# Patient Record
Sex: FEMALE | Race: ASIAN | NOT HISPANIC OR LATINO
[De-identification: names, ages, dates, MRNs, and addresses within clinical notes are randomized per-mention and may not be internally consistent; named-entity substitution may affect disease eponyms.]

---
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
## 2019-02-01 ENCOUNTER — OUTPATIENT (OUTPATIENT)
Dept: OUTPATIENT SERVICES | Facility: HOSPITAL | Age: 19
LOS: 1 days | End: 2019-02-01

## 2019-02-01 ENCOUNTER — OUTPATIENT (OUTPATIENT)
Dept: OUTPATIENT SERVICES | Facility: HOSPITAL | Age: 19
LOS: 1 days | End: 2019-02-01
Payer: MEDICAID

## 2019-02-01 PROCEDURE — G9001: CPT

## 2019-02-28 ENCOUNTER — APPOINTMENT (OUTPATIENT)
Dept: PEDIATRIC ADOLESCENT MEDICINE | Facility: HOSPITAL | Age: 19
End: 2019-02-28
Payer: MEDICAID

## 2019-02-28 VITALS — HEART RATE: 85 BPM | DIASTOLIC BLOOD PRESSURE: 68 MMHG | SYSTOLIC BLOOD PRESSURE: 116 MMHG | WEIGHT: 102.5 LBS

## 2019-02-28 DIAGNOSIS — Z11.1 ENCOUNTER FOR SCREENING FOR RESPIRATORY TUBERCULOSIS: ICD-10-CM

## 2019-02-28 DIAGNOSIS — Z11.4 ENCOUNTER FOR SCREENING FOR HUMAN IMMUNODEFICIENCY VIRUS [HIV]: ICD-10-CM

## 2019-02-28 PROCEDURE — 99214 OFFICE O/P EST MOD 30 MIN: CPT

## 2019-03-01 DIAGNOSIS — Z71.89 OTHER SPECIFIED COUNSELING: ICD-10-CM

## 2019-03-01 NOTE — RISK ASSESSMENT
[Eats regular meals including adequate fruits and vegetables] : eats regular meals including adequate fruits and vegetables [Has friends] : has friends [Eats meals with family] : eats meals with family [Has family members/adults to turn to for help] : has family members/adults to turn to for help [Uses tobacco] : does not use tobacco [Uses drugs] : does not use drugs  [de-identified] : marijuana daily

## 2019-03-01 NOTE — DISCUSSION/SUMMARY
[FreeTextEntry1] : 19yo F here for f/u\par \par OCP\par - STI testing\par - Return in 2 weeks to discuss OCP\par \par Routine\par - Flu and Tdap\par \par Weight\par - Nutrition follow up\par - Return in 2 weeks for weight check\par

## 2019-03-01 NOTE — HISTORY OF PRESENT ILLNESS
[FreeTextEntry6] : 18 year old with a history of depression and eating disorder (many years post-treatment) presenting for work forms. 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 \par \par Was on OCP over the summer, but has not gotten refill since June. Had irregular periods during that time. Menses occur every 2-3 months since stopping OCP. LMP November 2019. Not currently sexually active. Last sexual activity in July. Interested in STD testing today.\par \par Thinks she lost weight since the summer. She is a vegetarian. Weight loss was not intentional. She was very stressed recently. Has been trying to gain weight. Feels like she misses meals because she has been busier.\par Breakfast: bagel, shake, fruit with oatmeal. Usually breakfast is 3 times per week\par Lunch: pizza one slice\par Dinner: pizza - 5 grandma slices\par Snack: protein bar or fruit\par \par \par

## 2019-03-01 NOTE — END OF VISIT
[] : Resident [FreeTextEntry3] : 18 year old female for f/u as needs form completed for EMT school. Tdap and Flu Vaccine administered without issue. VIS provided. Consent obtained. Counseled on common/severe reactions, reasons to see MD. Quantiferon gold sent. Will call when form complete. Noted to have irregular periods again off of OCP. Interested in going back on OCP. Also with unintended weight loss in setting of eating less over past 6 months. Think she may have reached 123 pounds last summer. Actively trying to gain weight over past few weeks once amount of weight loss noted. No restriction/desire to lose weight. Says mood is good. Denies any depressed mood or SI. WIll send labs below to check hormones and due to weight loss. Food log given, nutrition recs today. RTC 2 weeks. Will discuss OCP at that time vs working on weight gain to see if menses resume. \par

## 2019-03-05 LAB
C TRACH RRNA SPEC QL NAA+PROBE: NOT DETECTED
ESTRADIOL SERPL-MCNC: 54 PG/ML
FSH SERPL-MCNC: 6.6 IU/L
HIV1+2 AB SPEC QL IA.RAPID: NONREACTIVE
LH SERPL-ACNC: 9.9 IU/L
N GONORRHOEA RRNA SPEC QL NAA+PROBE: NOT DETECTED
SOURCE AMPLIFICATION: NORMAL
T4 SERPL-MCNC: 6.8 UG/DL
TSH SERPL-ACNC: 1.71 UIU/ML

## 2019-03-12 ENCOUNTER — FORM ENCOUNTER (OUTPATIENT)
Age: 19
End: 2019-03-12

## 2019-03-12 ENCOUNTER — APPOINTMENT (OUTPATIENT)
Dept: PEDIATRIC ADOLESCENT MEDICINE | Facility: HOSPITAL | Age: 19
End: 2019-03-12
Payer: MEDICAID

## 2019-03-12 VITALS — DIASTOLIC BLOOD PRESSURE: 69 MMHG | WEIGHT: 106 LBS | HEART RATE: 80 BPM | SYSTOLIC BLOOD PRESSURE: 110 MMHG

## 2019-03-12 DIAGNOSIS — R76.12 NONSPECIFIC REACTION TO CELL MEDIATED IMMUNITY MEASUREMENT OF GAMMA INTERFERON ANTIGEN RESPONSE W/OUT ACTIVE TUBERCULOSIS: ICD-10-CM

## 2019-03-12 DIAGNOSIS — R63.4 ABNORMAL WEIGHT LOSS: ICD-10-CM

## 2019-03-12 DIAGNOSIS — N92.6 IRREGULAR MENSTRUATION, UNSPECIFIED: ICD-10-CM

## 2019-03-12 PROCEDURE — 99214 OFFICE O/P EST MOD 30 MIN: CPT

## 2019-03-12 NOTE — PHYSICAL EXAM
[No Acute Distress] : no acute distress [Normocephalic] : normocephalic [EOMI] : EOMI [Clear TM bilaterally] : clear tympanic membranes bilaterally [Pink Nasal Mucosa] : pink nasal mucosa [Nonerythematous Oropharynx] : nonerythematous oropharynx [Nontender Cervical Lymph Nodes] : nontender cervical lymph nodes [Supple] : supple [FROM] : full passive range of motion [Clear to Ausculatation Bilaterally] : clear to auscultation bilaterally [Regular Rate and Rhythm] : regular rate and rhythm [Normal S1, S2 audible] : normal S1, S2 audible [No Murmurs] : no murmurs [Soft] : soft [NonTender] : non tender [Non Distended] : non distended [Normal Bowel Sounds] : normal bowel sounds [No Hepatosplenomegaly] : no hepatosplenomegaly [No Abnormal Lymph Nodes Palpated] : no abnormal lymph nodes palpated [Moves All Extremities x 4] : moves all extremities x4 [Warm, Well Perfused x4] : warm, well perfused x4 [Capillary Refill <2s] : capillary refill < 2s [Straight] : straight [Normotonic] : normotonic [Warm] : warm [Dry] : dry

## 2019-03-13 ENCOUNTER — OUTPATIENT (OUTPATIENT)
Dept: OUTPATIENT SERVICES | Facility: HOSPITAL | Age: 19
LOS: 1 days | End: 2019-03-13
Payer: MEDICAID

## 2019-03-13 ENCOUNTER — APPOINTMENT (OUTPATIENT)
Dept: RADIOLOGY | Facility: IMAGING CENTER | Age: 19
End: 2019-03-13
Payer: MEDICAID

## 2019-03-13 ENCOUNTER — OUTPATIENT (OUTPATIENT)
Dept: OUTPATIENT SERVICES | Facility: HOSPITAL | Age: 19
LOS: 1 days | End: 2019-03-13

## 2019-03-13 ENCOUNTER — APPOINTMENT (OUTPATIENT)
Dept: RADIOLOGY | Facility: CLINIC | Age: 19
End: 2019-03-13

## 2019-03-13 DIAGNOSIS — R76.12 NONSPECIFIC REACTION TO CELL MEDIATED IMMUNITY MEASUREMENT OF GAMMA INTERFERON ANTIGEN RESPONSE WITHOUT ACTIVE TUBERCULOSIS: ICD-10-CM

## 2019-03-13 LAB
BASOPHILS # BLD AUTO: 0.07 K/UL
BASOPHILS NFR BLD AUTO: 0.9 %
EOSINOPHIL # BLD AUTO: 0.1 K/UL
EOSINOPHIL NFR BLD AUTO: 1.3 %
HCT VFR BLD CALC: 38.2 %
HGB BLD-MCNC: 11.3 G/DL
IMM GRANULOCYTES NFR BLD AUTO: 0.1 %
LYMPHOCYTES # BLD AUTO: 3.43 K/UL
LYMPHOCYTES NFR BLD AUTO: 45.1 %
M TB IFN-G BLD-IMP: POSITIVE
MAN DIFF?: NORMAL
MCHC RBC-ENTMCNC: 22.6 PG
MCHC RBC-ENTMCNC: 29.6 GM/DL
MCV RBC AUTO: 76.6 FL
MONOCYTES # BLD AUTO: 0.58 K/UL
MONOCYTES NFR BLD AUTO: 7.6 %
NEUTROPHILS # BLD AUTO: 3.41 K/UL
NEUTROPHILS NFR BLD AUTO: 45 %
PLATELET # BLD AUTO: 298 K/UL
QUANTIFERON TB PLUS MITOGEN MINUS NIL: 6.66 IU/ML
QUANTIFERON TB PLUS NIL: 0.11 IU/ML
QUANTIFERON TB PLUS TB1 MINUS NIL: 5.82 IU/ML
QUANTIFERON TB PLUS TB2 MINUS NIL: 6.66 IU/ML
RBC # BLD: 4.99 M/UL
RBC # FLD: 17.6 %
WBC # FLD AUTO: 7.6 K/UL

## 2019-03-13 PROCEDURE — 71046 X-RAY EXAM CHEST 2 VIEWS: CPT

## 2019-03-13 PROCEDURE — 71046 X-RAY EXAM CHEST 2 VIEWS: CPT | Mod: 26

## 2019-03-13 NOTE — DISCUSSION/SUMMARY
[FreeTextEntry1] : 18 year old w/ hx of TB exposure in 2015, s/p 9 month therapy, now w/ positive quant gold. Reportedly had negative chest xray last year but will send for repeat chest xray and follow up with infectious disease. \par Pt eating better than in the past, weight up 3.5 lbs in 12 days. \par

## 2019-03-13 NOTE — END OF VISIT
[] : Resident [FreeTextEntry3] : Will order CXR and refer to adult ID. No symptoms of acute TB. Discussed not using OCP yet as not planning to be sexually active and can follow periods to see if cycles become normal. Making good efforts with eating. Will f/u in 2 weeks to continue to monitor weight gain. Will call once CXR result available so that she can submit EMT paperwork.

## 2019-03-13 NOTE — REVIEW OF SYSTEMS
[Fever] : no fever [Chills] : no chills [Night Sweats] : no night sweats [Headache] : no headache [Nasal Discharge] : no nasal discharge [Sore Throat] : no sore throat [Diaphoresis] : not diaphoretic [Cough] : no cough [Vomiting] : no vomiting [Abdominal Pain] : no abdominal pain [Weakness] : no weakness [Rash] : no rash [Dysuria] : no dysuria

## 2019-03-13 NOTE — HISTORY OF PRESENT ILLNESS
[FreeTextEntry6] : \par Exposed to TB in 2005 when in [place]. Took medication for 9 months. Since then gets chest xrays when needs TB screening. Last year, reports CXR negative at [facility]. 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 Has GED. \par \par Home - lives with Mom and boyfriend. Moved here in 2015 from [place]. Dad lives in [place] with 3 younger siblings. Does not fight often with mother. Mother is depressed, pt feels like she now "has to be the mother." \par Sleep - Every night has trouble falling asleep, stays up late. No racing thoughts, no feelings of adrenaline, neg manic symptoms. Took Seroquel in the past but did not like the way it made her feel. \par Drugs - Smokes marijuana daily. \par Eating - Feels that smoking marijuana has helped her with appetite in the past. If could choose a weight, would wanna be 140/150. Will skip meals because not hungry. Trying to get back into the habit of 3 meals a day. \par Sex - Not currently sexually active. STI testing last visit. Was on birth control this summer into the winter but had to stop because no insurance. LMP last week, lasted 5 days, light.

## 2019-03-28 ENCOUNTER — APPOINTMENT (OUTPATIENT)
Dept: PEDIATRIC ADOLESCENT MEDICINE | Facility: HOSPITAL | Age: 19
End: 2019-03-28

## 2019-04-04 ENCOUNTER — APPOINTMENT (OUTPATIENT)
Dept: PEDIATRIC ADOLESCENT MEDICINE | Facility: HOSPITAL | Age: 19
End: 2019-04-04

## 2019-06-04 ENCOUNTER — EMERGENCY (EMERGENCY)
Facility: HOSPITAL | Age: 19
LOS: 1 days | Discharge: HOME | End: 2019-06-04
Attending: STUDENT IN AN ORGANIZED HEALTH CARE EDUCATION/TRAINING PROGRAM | Admitting: STUDENT IN AN ORGANIZED HEALTH CARE EDUCATION/TRAINING PROGRAM
Payer: MEDICAID

## 2019-06-04 VITALS
OXYGEN SATURATION: 100 % | SYSTOLIC BLOOD PRESSURE: 116 MMHG | DIASTOLIC BLOOD PRESSURE: 85 MMHG | RESPIRATION RATE: 16 BRPM | HEART RATE: 71 BPM | TEMPERATURE: 98 F

## 2019-06-04 PROCEDURE — 99283 EMERGENCY DEPT VISIT LOW MDM: CPT

## 2019-06-04 PROCEDURE — 71046 X-RAY EXAM CHEST 2 VIEWS: CPT | Mod: 26

## 2019-06-04 NOTE — ED PROVIDER NOTE - NSFOLLOWUPINSTRUCTIONS_ED_ALL_ED_FT
Follow up with your primary doctor and/or psychiatrist.    Your Chest XRAY and EKG were normal.     Return to ER for new or concerning symptoms.

## 2019-06-04 NOTE — ED PROVIDER NOTE - CLINICAL SUMMARY MEDICAL DECISION MAKING FREE TEXT BOX
Zenaida PGY1- young healthy female 19, bib family for concern for sob, chest heaviness, hx of anxiety, prior panic episodes in past feel similar, feeling much better in ED, no recent illness, VSS, normal exam, heart rrr, lungs cta, normal neuro exam, SBIRT neg, normal EKG, will obtain CXR and discharge for follow up with PCP, possibly psych

## 2019-06-04 NOTE — ED PROVIDER NOTE - PHYSICAL EXAMINATION
PHYSICAL EXAM:  GENERAL: NAD, well-groomed, well-developed  HEAD:  Atraumatic, Normocephalic  EYES: EOMI, PERRLA, conjunctiva and sclera clear  ENMT: No tonsillar erythema, exudates, or enlargement; Moist mucous membranes  NECK: Supple, No JVD  HEART: Regular rate and rhythm; No murmurs, rubs, or gallops  RESPIRATORY: CTA B/L, No W/R/R  ABDOMEN: Soft, Nontender, Nondistended  BACK: no cva or midline tenderness, normal ROM  NEURO: A&Ox3, nonfocal, moving all extremities  EXTREMITIES:  2+ Peripheral Pulses, No clubbing, cyanosis, or edema  SKIN: warm, dry, normal color, no rash or abnormal lesions  PSYCH: mildly anxious, no SI/HI, no AVH

## 2019-06-04 NOTE — ED ADULT TRIAGE NOTE - CHIEF COMPLAINT QUOTE
pt comes to ED for chest pain for a few days. pt does not think it is related to stress. pt VSS resp even and unlabored. ekg to be obtained

## 2019-06-04 NOTE — ED PROVIDER NOTE - ATTENDING CONTRIBUTION TO CARE
20 yo female presents to ED for evaluation of shortness of breath with chest heaviness earlier today, similar symptoms about 1 month ago. Reports trying taking Ventolin earlier in the week with mod improvement of symptoms. States she is feeling better now. Reports she has been undergoing increased stress, thinks she might be having anxiety attacks. Denies fevrs, chills, nausea, vomiting, abd pain.   Gen: no acute distress, well appearing, awake, alert and oriented x 3  Cardiac: regular rate and rhythm, +S1S2  Pulm: Clear to auscultation bilaterally  Abd: soft, nontender, nondistended, no guarding  Back: neg CVA ttp, nontender spine  Extremity: no edema, no deformity, warm and well perfused, FROM all extremities    Neuro: awake, alert, oriented x 3, sensorimotor intact    MDM  Well appearing 20 yo female, no sig PMH presents to ED for episodes of chest heaviness and SOB over past 1 month. Likely anxiety, will check EKG, reassess

## 2019-06-04 NOTE — ED PROVIDER NOTE - OBJECTIVE STATEMENT
19 yoF, PMHx of anxiety, no currently on meds presents to ED after episodes of chest heaviness and SOB in school today. Has had episodes like this in past, most recently at [facility] 1 month ago. Usually last 10-15 minutes then improve. Pt has siblings with asthma so has tried ventolin over past 2 days because of intermittent sx thinking it may help. Presently feels much better. Thinks it was anxiety. Has felt stressed over last few months. No cough or fever. Otherwise feels well. Had physical with labs 1 month ago and at that time had preg test and STD check, all of which were normal. Not sexually active since then. Intermittent marijuana use, not this week. Social etoh but not daily. SBIRT neg with mother out of room. last MP 1 week ago, not heavy. No exercise intolerance.

## 2019-06-19 ENCOUNTER — EMERGENCY (EMERGENCY)
Facility: HOSPITAL | Age: 19
LOS: 1 days | Discharge: HOME | End: 2019-06-19
Admitting: EMERGENCY MEDICINE
Payer: MEDICAID

## 2019-06-19 VITALS
SYSTOLIC BLOOD PRESSURE: 109 MMHG | HEART RATE: 97 BPM | TEMPERATURE: 97 F | DIASTOLIC BLOOD PRESSURE: 68 MMHG | OXYGEN SATURATION: 100 % | RESPIRATION RATE: 18 BRPM

## 2019-06-19 PROCEDURE — 73120 X-RAY EXAM OF HAND: CPT | Mod: 26,RT

## 2019-06-19 PROCEDURE — 99283 EMERGENCY DEPT VISIT LOW MDM: CPT

## 2019-06-19 NOTE — ED PROVIDER NOTE - CLINICAL SUMMARY MEDICAL DECISION MAKING FREE TEXT BOX
Pt is a 20 y/o F with PMHx of anxiety and depression who presents to the ED c/o R hand pain and swelling after punching a wall yesterday morning. Plan for XR right hand to r/o fracture.

## 2019-06-19 NOTE — ED PROVIDER NOTE - MUSCULOSKELETAL, MLM
R hand with bruising of third and fourth MCP joints. No swelling. Limited flexion of fingers secondary to pain. NV intact. Tender along the third and fourth MCP. R hand with bruising of third and fourth MCP joints. No swelling. Limited flexion of fingers secondary to pain. Able to wiggle fingers well. NV intact. Tender along the third and fourth MCP.

## 2019-06-19 NOTE — ED PROVIDER NOTE - NSFOLLOWUPINSTRUCTIONS_ED_ALL_ED_FT
Follow up with your PMD within 48-72 hours.  Recommend ortho follow up within the week. Referral list provided. Rest, ice and elevate. Worsening pain, swelling, numbness, weakness or new concerning symptoms return to the Emergency Department.

## 2019-06-19 NOTE — ED PROVIDER NOTE - OBJECTIVE STATEMENT
Pt is a 20 y/o F with PMHx of anxiety and depression who presents to the ED c/o R hand pain and swelling after punching a wall yesterday morning when she was angry. Pt has been icing her hand. Pt does not see a psychiatrist and is not on any medications. She is right hand dominant. Denies any chills or fever. Denies any thoughts of hurting herself or others or any hallucinations. Pt is a 20 y/o F with PMHx of anxiety and depression who presents to the ED c/o R hand pain and swelling after punching a wall yesterday morning when she was angry. Pt has been icing her hand. Pt does not see a psychiatrist and is not on any medications. She is right hand dominant. Denies any chills or fever. Denies any thoughts of hurting herself or others or any hallucinations. Pt does not believe in taking pain medication. No cp, sob, abd pain, n/v/d, weakness, numbness, tingling.

## 2019-06-20 PROBLEM — F41.9 ANXIETY DISORDER, UNSPECIFIED: Chronic | Status: ACTIVE | Noted: 2019-06-04
